# Patient Record
(demographics unavailable — no encounter records)

---

## 2025-05-05 NOTE — PROCEDURE
Conjunctivitis Caused by Infection  Infections are caused by viruses or germs (bacteria). Treatment includes keeping your eyes and hands clean. Your health care provider may prescribe eye drops, and tell you to stay home from work or school if you re contagious. Untreated infections can be serious. It's important to see your provider for a diagnosis.    Viral infections  A cold, flu, or other virus can spread to your eyes. This causes a watery discharge. Your eyes may burn or itch and get red. Your eyelids may also be puffy and sore.  Treatment  Most viral infections go away on their own. Artificial tears and warm compresses can relieve symptoms. Your provider may also prescribe eye drops. A viral infection can be very contagious and spreads quickly. To prevent this, wash your hands often. Use a separate tissue to wipe each eye. Don t touch your eyes or share bedding or towels.   Bacterial infections  Bacterial infections often occur in one eye. There may be a watery or a thick discharge from the eye. These infections can cause serious damage to your eye if not treated promptly.  Treatment  Your provider may prescribe eye drops or ointment to kill the bacteria. Warm compresses can help keep the eyelids clean. To keep the bacteria from spreading, wash your hands often. Use a separate tissue to wipe each eye. Don t touch your eyes or share bedding or towels.    7267-1552 [employer]. [address] [place] [address], [place] [address]. All rights reserved. This information is not intended as a substitute for professional medical care. Always follow your healthcare professional's instructions.         [Cervical Pap Smear] : cervical Pap smear [Liquid Base] : liquid base [Tolerated Well] : the patient tolerated the procedure well [No Complications] : there were no complications

## 2025-05-05 NOTE — HISTORY OF PRESENT ILLNESS
[N] : Patient denies prior pregnancies [Menarche Age: ____] : age at menarche was [unfilled] [Currently Active] : currently active [Men] : men [Yes] : Yes [No] : never pregnant [FreeTextEntry1] : 04/09/2025 [FreeTextEntry3] : PIll

## 2025-05-05 NOTE — PLAN
[FreeTextEntry1] : annual: pap and gc/ct sti arsenio  *known b/l fibroadenoma: get marcel was seeing jossie diaz in [place]: referred to one for her Risks, benefits, alternative to combined oral contraceptives discussed. Patient told to carefully read package insert.  did  get hpv vaccine

## 2025-07-16 NOTE — PAST MEDICAL HISTORY
[Menstruating] : The patient is menstruating [Menarche Age ____] : age at menarche was [unfilled] [Definite ___ (Date)] : the last menstrual period was [unfilled] [Regular Cycle Intervals] : have been regular [Total Preg ___] : G[unfilled] [History of Hormone Replacement Treatment] : has no history of hormone replacement treatment [FreeTextEntry6] : no [FreeTextEntry7] : yes [FreeTextEntry8] : n/a

## 2025-07-16 NOTE — PHYSICAL EXAM
[Normocephalic] : normocephalic [EOMI] : extra ocular movement intact [Supple] : supple [No Supraclavicular Adenopathy] : no supraclavicular adenopathy [No Cervical Adenopathy] : no cervical adenopathy [de-identified] :  L breast/axilla/supraclavicular area: No masses, discharge, or adenopathy Right breast/axilla/supraclavicular area-2 cm palpable mass 10:00, 2 cm palpable mass 8:00, 1 cm palpable mass 1:00 all consistent with findings of benign-appearing fibroadenomas on ultrasound from 6/27/2025

## 2025-07-16 NOTE — HISTORY OF PRESENT ILLNESS
[FreeTextEntry1] :  Patient is a 27 yo F who presents today for second opinion of likely bilateral fibroadenomas. Patient is seeking second opinion today as she has been told she has likely B/l FAs since 2015 (previous imaging at [facility]). Patient had NPA OV with OB/GYN, Dr. Destiney Morales 5/5/25 who sent her for B/ldxUS to have radiology review of multiple B/l breast masses. On most recent B/ldxUS at [facility] 6/27/25, patient with multiple stable B/l breast masses deemed likely FAs with new R 0.5cm 3:00 RA and L 1cm 12:00 5FN mass recommended for 6m f/u B/ldxUS. Patient previously following with breast surgeon in [place] (Dr. Wesly PETERSON). Denies family history of breast or ovarian cancer. Patient denies palpable masses, skin changes, or nipple discharge bilaterally.  Patient states that she has been on birth control for the past 8 years.  DEMETRA Lifetime Risk- 17.1% 2025 4/8/25 B/L US ([place])-R 12:00 2 CFN 1 cm hypoechoic mass.  L 12:00 4 CFN 1.2 cm heterogeneous hypoechoic circumscribed mass.  BI-RADS 3 (rec B/L US in 12 months) 6/27/2025: B/L DX US ([facility])-B/L stable multiple masses breast masses likely FAs, largest R 2.1 cm 10:00 4 FN mass, R 1.9 cm 7:00 4 FN mass, L 1.8 cm 3:00 3 FN mass, L1.6 cm 12:00 5 FN mass.  R relatively stable 2.1 cm 1:00 2 FN mass likely FA, palpable for patient. R new 0.5cm 3:00 RA mass (Rec 6m f/u RdxUS). L new 1cm 12:00 5FN mass (Rec 6m f/u LdxUS).

## 2025-07-16 NOTE — PLAN
[TextEntry] : Reviewed prior imaging findings and discussed results with patient. We reviewed FAs as a benign and common findings in young women and that they do not put you at an increased risk of breast cancer. We discussed that FAs while they do not put you at an increased risk of breast cancer, they do put you at an increased risk of growing more FAs.  We discussed with patient as fibroadenomas stable on imaging no need for surgical excision.  Discussed with patient recommendation for 6-month follow-up B/L DX US to ensure stability of new subcentimeter B/L breast masses. She is to return for reexamination at that time in benign disease clinic with Alicia Fong NP.   We reviewed with patient should she notice any new breast masses or growth of any of her known palpable masses she is to reach out to the office immediately for next steps.  Patient is to begin annual imaging at age 40.

## 2025-07-16 NOTE — HISTORY OF PRESENT ILLNESS
Behavioral Services  Medicare Certification Upon Admission    I certify that this patient's inpatient psychiatric hospital admission is medically necessary for:    [x] (1) Treatment which could reasonably be expected to improve this patient's condition,       [x] (2) Or for diagnostic study;     AND     [x](2) The inpatient psychiatric services are provided while the individual is under the care of a physician and are included in the individualized plan of care.     Estimated length of stay/service 5-9 days    Plan for post-hospital care -outpatient care    Electronically signed by Ashley Li MD on 11/4/2021 at 3:49 PM [FreeTextEntry1] :  Patient is a 25 yo F who presents today for second opinion of likely bilateral fibroadenomas. Patient is seeking second opinion today as she has been told she has likely B/l FAs since 2015 (previous imaging at [facility]). Patient had NPA OV with OB/GYN, Dr. Destiney Morales 5/5/25 who sent her for B/ldxUS to have radiology review of multiple B/l breast masses. On most recent B/ldxUS at [facility] 6/27/25, patient with multiple stable B/l breast masses deemed likely FAs with new R 0.5cm 3:00 RA and L 1cm 12:00 5FN mass recommended for 6m f/u B/ldxUS. Patient previously following with breast surgeon in [place] (Dr. Wesly PETERSON). Denies family history of breast or ovarian cancer. Patient denies palpable masses, skin changes, or nipple discharge bilaterally.  Patient states that she has been on birth control for the past 8 years.  DEMETRA Lifetime Risk- 17.1% 2025 4/8/25 B/L US ([place])-R 12:00 2 CFN 1 cm hypoechoic mass.  L 12:00 4 CFN 1.2 cm heterogeneous hypoechoic circumscribed mass.  BI-RADS 3 (rec B/L US in 12 months) 6/27/2025: B/L DX US ([facility])-B/L stable multiple masses breast masses likely FAs, largest R 2.1 cm 10:00 4 FN mass, R 1.9 cm 7:00 4 FN mass, L 1.8 cm 3:00 3 FN mass, L1.6 cm 12:00 5 FN mass.  R relatively stable 2.1 cm 1:00 2 FN mass likely FA, palpable for patient. R new 0.5cm 3:00 RA mass (Rec 6m f/u RdxUS). L new 1cm 12:00 5FN mass (Rec 6m f/u LdxUS).

## 2025-07-16 NOTE — PHYSICAL EXAM
[Normocephalic] : normocephalic [EOMI] : extra ocular movement intact [Supple] : supple [No Supraclavicular Adenopathy] : no supraclavicular adenopathy [No Cervical Adenopathy] : no cervical adenopathy [de-identified] :  L breast/axilla/supraclavicular area: No masses, discharge, or adenopathy Right breast/axilla/supraclavicular area-2 cm palpable mass 10:00, 2 cm palpable mass 8:00, 1 cm palpable mass 1:00 all consistent with findings of benign-appearing fibroadenomas on ultrasound from 6/27/2025